# Patient Record
Sex: MALE
[De-identification: names, ages, dates, MRNs, and addresses within clinical notes are randomized per-mention and may not be internally consistent; named-entity substitution may affect disease eponyms.]

---

## 2022-11-07 ENCOUNTER — APPOINTMENT (OUTPATIENT)
Dept: ORTHOPEDIC SURGERY | Facility: CLINIC | Age: 42
End: 2022-11-07
Payer: COMMERCIAL

## 2022-11-07 ENCOUNTER — NON-APPOINTMENT (OUTPATIENT)
Age: 42
End: 2022-11-07

## 2022-11-07 VITALS
BODY MASS INDEX: 25.18 KG/M2 | HEART RATE: 73 BPM | WEIGHT: 170 LBS | DIASTOLIC BLOOD PRESSURE: 77 MMHG | HEIGHT: 69 IN | SYSTOLIC BLOOD PRESSURE: 118 MMHG

## 2022-11-07 PROBLEM — Z00.00 ENCOUNTER FOR PREVENTIVE HEALTH EXAMINATION: Status: ACTIVE | Noted: 2022-11-07

## 2022-11-07 PROCEDURE — 99203 OFFICE O/P NEW LOW 30 MIN: CPT

## 2022-11-07 PROCEDURE — 73030 X-RAY EXAM OF SHOULDER: CPT | Mod: LT

## 2022-11-07 NOTE — DISCUSSION/SUMMARY
[de-identified] : Discussed findings of today's exam and possible causes of patient's pain.  Educated patient on their most probable diagnosis of chronic intermittent left shoulder pain with recent atraumatic exacerbation due to possible labral pathology.  Reviewed possible courses of treatment, and we collaboratively decided best course of treatment at this time will include conservative management.  Patient is physically active, he works on a regular basis, he has been unable to perform overhead activity for the last several months because of this left shoulder pain.  Is also giving him pain at night that can keep him up and/or wake him up at night in certain positions.  The patient's brother is a physical therapist, he advised him on a home exercise program to follow, he has tried those exercises that much relief.  Patient will intermittently take oral NSAIDs as needed for pain relief.  Patient has a history of right shoulder labral pathology which required surgical intervention in 2016.  Based on history and clinical exam there is concern for labral pathology as current cause of his left shoulder pain.  Recommend we proceed with MRI of the left shoulder to evaluate for labral and/or rotator cuff pathology.  Patient will follow-up once MRI results are available.  Patient appreciates and agrees with current plan.\par \par I work as part of an academic orthopedic group and routinely have a physician in training (resident / fellow) working with me.  Any part of the history and physical exam performed by the physician in training was either directly reviewed and/or replicated by myself.  Any procedure performed by the physician in training was performed under my direct supervision and with the consent of the patient.\par \par This note was generated using dragon medical dictation software.  A reasonable effort has been made for proofreading its contents, but typos may still remain.  If there are any questions or points of clarification needed please notify my office.

## 2022-11-07 NOTE — PHYSICAL EXAM
[de-identified] : Constitutional: Well-nourished, well-developed, No acute distress\par Respiratory:  Good respiratory effort, no SOB\par Lymphatic: No regional lymphadenopathy, no lymphedema\par Psychiatric: Pleasant and normal affect, alert and oriented x3\par Musculoskeletal: normal except where as noted in regional exam\par \par Left shoulder:\par APPEARANCE: no marked deformities, no swelling or malalignment\par POSITIVE TENDERNESS: Anterior and posterior capsule.\par NONTENDER: supraspinatus, infraspinatus, teres minor, biceps, and AC joint. \par ROM: full with mild pain at end range of flexion, abduction and ER, no scapular winging or dyskinesia present\par RESISTIVE TESTING: painless 4/5 resisted flexion, empty can/ER/IR, 5/5 painless flexion, horizontal abd/add \par SPECIAL TESTS: + [place]'s, + Apprehension/Relocation, mildly + cross arm adduction. neg Drop Arm, neg Yoder/Neers. neg Speeds. neg apley's scratch test\par  [de-identified] : \par The following radiographs were ordered and read by me during this patient's visit. I reviewed each radiograph in detail with the patient and discussed the findings as highlighted below. \par \par 3 views of the left shoulder were obtained today that show no fracture, or dislocation. There are no degenerative changes seen. There is no malalignment. No obvious osseous abnormality. Otherwise unremarkable.

## 2022-11-07 NOTE — HISTORY OF PRESENT ILLNESS
[de-identified] : LHD Patient is here for left shoulder pain that began around 08/22. There was no inciting injury but he thinks that it may have come from exercising with kettle bells. This is the first evaluation for this issue. He has used OTC treatments intermittently. Patient will occasionally feel N/T in his hand. Denies prior injury. He has modified exercise. He does not work. \par \par The patient's past medical history, past surgical history, medications and allergies were reviewed by me today and documented accordingly. In addition, the patient's family and social history, which were noncontributory to this visit, were reviewed also. Intake form was reviewed. The patient has no family history of arthritis.

## 2022-11-29 ENCOUNTER — APPOINTMENT (OUTPATIENT)
Dept: ORTHOPEDIC SURGERY | Facility: CLINIC | Age: 42
End: 2022-11-29
Payer: COMMERCIAL

## 2022-11-29 PROCEDURE — 99213 OFFICE O/P EST LOW 20 MIN: CPT

## 2022-11-29 NOTE — PHYSICAL EXAM
[de-identified] : Constitutional: Well-nourished, well-developed, No acute distress\par Respiratory:  Good respiratory effort, no SOB\par Lymphatic: No regional lymphadenopathy, no lymphedema\par Psychiatric: Pleasant and normal affect, alert and oriented x3\par Musculoskeletal: normal except where as noted in regional exam\par \par Left shoulder:\par APPEARANCE: no marked deformities, no swelling or malalignment\par POSITIVE TENDERNESS: Anterior and posterior capsule.\par NONTENDER: supraspinatus, infraspinatus, teres minor, biceps, and AC joint. \par ROM: full with mild pain at end range of flexion, abduction and ER, no scapular winging or dyskinesia present\par RESISTIVE TESTING: painless 4/5 resisted flexion, empty can/ER/IR, 5/5 painless flexion, horizontal abd/add \par SPECIAL TESTS: + [place]'s, + Apprehension/Relocation, mildly + cross arm adduction. neg Drop Arm, neg Yoder/Neers. neg Speeds. neg apley's scratch test\par  [de-identified] : I reviewed, interpreted and clinically correlated the following outside imaging studies,\par  Date of Exam: 11-\par  \par EXAM:  MRI LEFT SHOULDER WITHOUT CONTRAST\par \par HISTORY:  Left shoulder pain for 2 months. Possible exercise injury. Surgery in 2016.\par \par TECHNIQUE: Multisequence multiplanar MRI of the shoulder was performed with axial, sagittal and coronal T1/proton density and T2/inversion recovery weighted pulse sequences, without intravenous or intra-articular contrast material.\par \par COMPARISON:  None.\par \par FINDINGS:\par \par Rotator cuff: Mild supraspinatus, infraspinatus, and subscapularis tendinosis, without a tear. Otherwise normal rotator cuff tendons. Normal rotator cuff muscles.\par \par Glenohumeral joint: Nondisplaced tear of the superior labrum from the 11-1 o'clock positions. The tear extends into the posterior labrum, to the 7 o'clock position. 5 mm posterior paralabral cyst at the 8-9 o'clock positions. Moderate articular cartilage thinning over the medial humeral head compatible with osteoarthritis. Small effusion.\par \par Long biceps tendon and rotator cuff interval: Normal long biceps tendon and rotator cuff interval.\par \par Acromioclavicular joint and rotator cuff outlet: Normal acromioclavicular joint. No subacromial spur.\par \par Bursae: There is no bursitis. \par \par Osseous structures: Normal bone marrow signals. There are no fractures.\par \par Other: Normal quadrilateral and axillary spaces. Normal visualized deltoid, pectoralis major and minor, coracobrachialis, teres major, and lateral and long heads of the triceps muscles.\par \par IMPRESSION:  MRI of the left shoulder demonstrates:\par \par 1.  Nondisplaced tearing of the posterior and superior labrum. 5 mm posterior paralabral cyst.\par 2.  Mild rotator cuff tendinosis, without a tear.

## 2022-11-29 NOTE — HISTORY OF PRESENT ILLNESS
[de-identified] : Patient is here for left shoulder pain follow up. Patient is here to review MRI results.

## 2022-11-29 NOTE — DISCUSSION/SUMMARY
[de-identified] : Patient was seen today for reevaluation of chronic left shoulder pain, we were able to review his MRI results which shows he has a small posterior labral tear.  Patient is advised this is likely etiology of his chronic intermittent pain.  Recommended trial of conservative management, patient should proceed with a course of physical therapy at this time.  We discussed appropriate activity modification to avoid further exacerbation of his pain.  If patient is having gradual improvement over the next 4-8 weeks recommend continued conservative management, if he does not have significant interval improvement would recommend surgical consultation at that time to discuss risk/benefits of arthroscopic intervention.  Patient appreciates and agrees with current plan.\par \par I work as part of an academic orthopedic group and routinely have a physician in training (resident / fellow) working with me.  Any part of the history and physical exam performed by the physician in training was either directly reviewed and/or replicated by myself.  Any procedure performed by the physician in training was performed under my direct supervision and with the consent of the patient.\par \par This note was generated using dragon medical dictation software.  A reasonable effort has been made for proofreading its contents, but typos may still remain.  If there are any questions or points of clarification needed please notify my office.\par

## 2022-12-05 ENCOUNTER — APPOINTMENT (OUTPATIENT)
Dept: ORTHOPEDIC SURGERY | Facility: CLINIC | Age: 42
End: 2022-12-05
Payer: COMMERCIAL

## 2022-12-05 VITALS
SYSTOLIC BLOOD PRESSURE: 127 MMHG | WEIGHT: 170 LBS | BODY MASS INDEX: 25.18 KG/M2 | HEIGHT: 69 IN | DIASTOLIC BLOOD PRESSURE: 81 MMHG | HEART RATE: 73 BPM

## 2022-12-05 DIAGNOSIS — S43.432D SUPERIOR GLENOID LABRUM LESION OF LEFT SHOULDER, SUBSEQUENT ENCOUNTER: ICD-10-CM

## 2022-12-05 DIAGNOSIS — M25.512 PAIN IN LEFT SHOULDER: ICD-10-CM

## 2022-12-05 PROCEDURE — 99214 OFFICE O/P EST MOD 30 MIN: CPT
